# Patient Record
(demographics unavailable — no encounter records)

---

## 2025-01-02 NOTE — HISTORY OF PRESENT ILLNESS
[None] : None [FreeTextEntry1] : Ms. Conrad is a 53 year old female PMHX of GERD, who is here today to discuss results of her recent Prenuvo scan which revealed a 3cm complex cystic lesion in the L upper pole of her kidney. Patient had headaches prior to the scan which prompted her to get the imaging. Urologically, she has never had any issues with kidney stones, recurrent UTIs, hematuria, bladder overactivity or  tumors. Her voiding pattern is fairly regular without any feelings of incomplete emptying or LUTS. She has never had hematuria. She denies any fevers, chills, flank pain, abd pain, issues with BMs or other constitutional symptoms. She has an karely with the images and report of her MRI scan available for review:   On review the MRI scan shows a complex multi-septated lesion in the left upper pole of the kidney. There are some thickened septations seen in one image however the scan is non contrast and does not include multiple images for review. She has no other prior cross sectional or dedicated retroperitoneal imaging for review.   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

## 2025-01-02 NOTE — ASSESSMENT
[FreeTextEntry1] : 53 year old female, PMH GERD with Prenuvo scan x 6months ago which shows 3cm complex cystic lesions in the left upper pole. No prior urinary issues or symptoms. No hematuria ever. No family history of  tumors, kidney stones or other urologic issues. Patient counselled about Bozniak scale for renal cysts and advised that she would need more dedicated imaging prior to moving forward with biopsy or resection if warranted. Patient voiced understanding, will proceed with repeat scan.   - MR abdomen with and without contrast renal protocol for L cystic lesion in upper pole of kidney - follow up after imaging

## 2025-01-02 NOTE — PHYSICAL EXAM
[Normal Appearance] : normal appearance [Well Groomed] : well groomed [General Appearance - In No Acute Distress] : no acute distress [Edema] : no peripheral edema [Abdomen Soft] : soft [Abdomen Tenderness] : non-tender [Costovertebral Angle Tenderness] : no ~M costovertebral angle tenderness [Normal Station and Gait] : the gait and station were normal for the patient's age [] : no rash [No Focal Deficits] : no focal deficits [Oriented To Time, Place, And Person] : oriented to person, place, and time [Affect] : the affect was normal [Mood] : the mood was normal

## 2025-01-29 NOTE — HISTORY OF PRESENT ILLNESS
[Home] : at home, [unfilled] , at the time of the visit. [Medical Office: ([facility])___] : at the medical office located in  [Verbal consent obtained from patient] : the patient, [unfilled] [FreeTextEntry1] : 1/2/25: Ms. Conrad is a 53 year old female PMHX of GERD, who is here today to discuss results of her recent Prenuvo scan which revealed a 3cm complex cystic lesion in the L upper pole of her kidney. Patient had headaches prior to the scan which prompted her to get the imaging. Urologically, she has never had any issues with kidney stones, recurrent UTIs, hematuria, bladder overactivity or  tumors. Her voiding pattern is fairly regular without any feelings of incomplete emptying or LUTS. She has never had hematuria. She denies any fevers, chills, flank pain, abd pain, issues with BMs or other constitutional symptoms. She has an karely with the images and report of her MRI scan available for review:   On review the MRI scan shows a complex multi-septated lesion in the left upper pole of the kidney. There are some thickened septations seen in one image however the scan is non contrast and does not include multiple images for review. She has no other prior cross sectional or dedicated retroperitoneal imaging for review.   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   ************** 1/29/25: Patient returns to review results of MRI of the abdomen.  The MR shows a 2 cm left, exophytic, upper pole, Bosniak III/IV complex cystic lesion concerning for malignancy.

## 2025-01-29 NOTE — ASSESSMENT
[FreeTextEntry1] : 52 yo female with 2 cm Bosniak III/IV left upper pole renal cystic lesion concerning for malignancy.  The patient's records were reviewed. An extensive/high level discussion on renal masses was held with the patient.  Renal masses were reviewed and the potential for benign lesions and malignant tumors was discussed. The role of additional radiologic imaging studies and for renal biopsy was discussed. Management options were discussed, including but not limited to observation, serial imaging studies, ablative therapies, partial nephrectomy, and radical nephrectomy. Open and minimally invasive (laparoscopic and robot-assisted) approaches were reviewed. The role of radiation and chemotherapy treatments was presented.  The merits and limitations of each of these options were discussed.   The patient is interested in proceeding with partial nephrectomy via a minimally invasive approach.  Risks of robot-assisted laparoscopic partial nephrectomy were discussed. The patient was thoroughly counseled about risks of the procedure including, but not limited to, bleeding, hemorrhage, infection, abscess formation, sepsis, injury to surrounding structures, organ injury (including but not limited to the intestines, stomach, spleen, liver, gallbladder, kidney, adrenal, and pancreas), pleural injury, vascular injury to the great vessels or accessory arteries or veins, bowel obstruction, bowel herniation, thermal or serosal injury to the bowel resulting in immediate or delayed bowel leak or perforation, delayed bleeding or hemorrhage, pseudoaneurysm or arteriovenous malformation development, ureteral injury or stricture, urine leaks, urinary fistula, wound infection, wound separation or dehiscence, incisional hernias, adhesions, chronic pain, delayed complications, renal insufficiency, renal failure, ileus, deep venous thrombosis, pulmonary embolus, pneumonia, neuromuscular complications, paresthesias, cerebrovascular accident, myocardial infarction, cardiac, respiratory or anesthetic complications, and mortality. The possibility of requiring a blood transfusion and its associated risks was discussed. The possibility of open conversion, re-operation, and potential need for additional intervention, (as well as machine breakdown in the setting of a robotic procedure) was thoroughly discussed.  The possibility of positive surgical margins, a benign pathologic diagnosis, or no tumor in the specimen was discussed. The potential for radical nephrectomy in the setting of attempted partial nephrectomy was discussed. The possibility of local recurrence of tumor, advanced, and metastatic disease was presented.  The patient asked questions and they were answered. The patient demonstrated understanding of the associated risks and benefits of the procedure and wished to proceed with surgery.   The patient will be prepared for robot-assisted laparoscopic partial nephrectomy as per the patient's request.  Plan: 1. Schedule Left robot-assisted, laparoscopic partial nephrectomy 2. PST 3. Medical clearance

## 2025-03-26 NOTE — ASSESSMENT
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  She is doing well post op. We will get surveillance imaging in 6 months.  She will RTC after surveillance imaging.

## 2025-03-26 NOTE — HISTORY OF PRESENT ILLNESS
[Home] : at home, [unfilled] , at the time of the visit. [Medical Office: ([facility])___] : at the medical office located in  [Verbal consent obtained from patient] : the patient, [unfilled] [FreeTextEntry1] : 1/2/25: Ms. Conrad is a 53 year old female PMHX of GERD, who is here today to discuss results of her recent Prenuvo scan which revealed a 3cm complex cystic lesion in the L upper pole of her kidney. Patient had headaches prior to the scan which prompted her to get the imaging. Urologically, she has never had any issues with kidney stones, recurrent UTIs, hematuria, bladder overactivity or  tumors. Her voiding pattern is fairly regular without any feelings of incomplete emptying or LUTS. She has never had hematuria. She denies any fevers, chills, flank pain, abd pain, issues with BMs or other constitutional symptoms. She has an karely with the images and report of her MRI scan available for review:   On review the MRI scan shows a complex multi-septated lesion in the left upper pole of the kidney. There are some thickened septations seen in one image however the scan is non contrast and does not include multiple images for review. She has no other prior cross sectional or dedicated retroperitoneal imaging for review.   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   ************** 1/29/25: Patient returns to review results of MRI of the abdomen.  The MR shows a 2 cm left, exophytic, upper pole, Bosniak III/IV complex cystic lesion concerning for malignancy.    ************** 3/26/25: Patient returns 1 week s/p left RALPN.  She is feeling well.  She is having regular flatus, but she has been constipated.  She has had 2 BMs with the help of enemas.  She denies any abdominal pain.  She has been tolerating her diet.    Pathology report confirms the lesion is a 1.7 cm ccRCC grade 1 with negative margins.

## 2025-03-26 NOTE — PHYSICAL EXAM
[Normal Appearance] : normal appearance [General Appearance - In No Acute Distress] : no acute distress [Edema] : no peripheral edema [] : no respiratory distress [Abdomen Soft] : soft [Abdomen Tenderness] : non-tender [Normal Station and Gait] : the gait and station were normal for the patient's age [Skin Color & Pigmentation] : normal skin color and pigmentation [No Focal Deficits] : no focal deficits [Oriented To Time, Place, And Person] : oriented to person, place, and time [de-identified] : Incisions C/D/I